# Patient Record
Sex: FEMALE | Race: WHITE | NOT HISPANIC OR LATINO | Employment: OTHER | URBAN - METROPOLITAN AREA
[De-identification: names, ages, dates, MRNs, and addresses within clinical notes are randomized per-mention and may not be internally consistent; named-entity substitution may affect disease eponyms.]

---

## 2016-04-21 LAB
CHOLEST SERPL-MSCNC: 154 MG/DL (ref 0–200)
HDLC SERPL-MCNC: 37 MG/DL
LDLC SERPL CALC-MCNC: 94 MG/DL
TRIGL SERPL-MCNC: 116 MG/DL

## 2020-01-27 ENCOUNTER — PATIENT OUTREACH (OUTPATIENT)
Dept: ADMINISTRATIVE | Facility: HOSPITAL | Age: 35
End: 2020-01-27

## 2020-01-27 NOTE — LETTER
January 27, 2020    Elizabeth Bacon  [address] [place] [address]             [facility]  [address]  [facility] [address]  Phone: [phone] Dear Elizabeth Bacon    [facility] is committed to your overall health.  To help you get the most out of each of your visits, we will review your information to make sure you are up to date on all of your recommended tests and/or procedures.      As a new patient to [facility], we may not have your complete medical records.  He has found that your chart shows you may be due for:    Health Maintenance Due   Topic Date Due    HIV Screening  12/08/2001    TETANUS VACCINE  12/08/2004    Pap Smear with HPV Cotest  04/18/2019    Influenza Vaccine (1) 09/01/2019     If you have had any of the above done at another facility, please bring the records or information with you so that your record at [facility] will be complete.      If you are currently taking medication, please bring it with you to your appointment for review.    Also, if you have any type of Advanced Directives, please bring them with you to your office visit so we may scan them into your chart.      Thank You,  Your [facility]  Giovanna Carlos L.P.N. Clinical Care Coordinator  [address] [facility] [place] [address] [phone]